# Patient Record
Sex: FEMALE | URBAN - METROPOLITAN AREA
[De-identification: names, ages, dates, MRNs, and addresses within clinical notes are randomized per-mention and may not be internally consistent; named-entity substitution may affect disease eponyms.]

---

## 2019-05-01 ENCOUNTER — THERAPY VISIT (OUTPATIENT)
Dept: PHYSICAL THERAPY | Facility: CLINIC | Age: 44
End: 2019-05-01
Payer: COMMERCIAL

## 2019-05-01 DIAGNOSIS — M54.50 ACUTE LEFT-SIDED LOW BACK PAIN WITHOUT SCIATICA: ICD-10-CM

## 2019-05-01 DIAGNOSIS — M54.6 ACUTE LEFT-SIDED THORACIC BACK PAIN: ICD-10-CM

## 2019-05-01 PROCEDURE — 97110 THERAPEUTIC EXERCISES: CPT | Mod: GP | Performed by: PHYSICAL THERAPIST

## 2019-05-01 PROCEDURE — 97161 PT EVAL LOW COMPLEX 20 MIN: CPT | Mod: GP | Performed by: PHYSICAL THERAPIST

## 2019-05-01 NOTE — LETTER
[facility]  [address]  [facility] [address]  [phone]    May 2, 2019    Re: Jm Baldwin   :   1975  MRN:  [id number]   REFERRING PHYSICIAN:   Larissa Delgadillo  [facility]  Date of Initial Evaluation: 2019  Visits:  Rxs Used: 1  Reason for Referral:     Acute left-sided low back pain without sciatica  Acute left-sided thoracic back pain  EVALUATION SUMMARY  [facility] [place] [facility] Initial Evaluation  Subjective:  The history is provided by the patient.   Jm Baldwin is a 43 year old female with a lumbar condition.  Condition occurred with:  Insidious onset.  Condition occurred: for unknown reasons.  This is a new and recurrent condition  Pt notes that she began having LBP about a month ago, pt not sure how the pain began. Her pain started in her L lower rib area. She stopped wearing her regular bra and switched to using a sports bra and the pain subsided, but didn't go away. She did consult w/her PCP for this 19 and was referred to PT..    Patient reports pain:  Lower thoracic spine and thoracic spine left (ribs area).  Radiates to:  No radiation.  Pain is described as aching (previously had stabbing, sharp pain) and is intermittent and reported as 4/10 (at worst 8/10).  Associated symptoms:  Loss of motion/stiffness. Pain is the same all the time.  Symptoms are exacerbated by sitting, twisting, lifting, standing, lying down and carrying (reaching) and relieved by rest and NSAID's (wearing sports bra/tank top, Tiger Balm).  Since onset symptoms are gradually improving.        General health as reported by patient is good.  Pertinent medical history includes:  Overweight.  Medical allergies: no.  Other surgeries include:  No.  Current medications:  Anti-inflammatory and pain medication.  Current occupation is 2 jobs, both are full time.  Patient is working in normal job without restrictions.  Primary  job tasks include:  Repetitive tasks, lifting, driving and other (computer work, push/pull).  Barriers include:  None as reported by the patient.  Red flags:  None as reported by the patient.  Objective:  System    Lumbar/SI Evaluation  ROM:    AROM Lumbar:   Flexion:          90% of NL w/pain  Ext:                    75%   Side Bend:        Left:  80% w/pain on L    Right:  80%, pulling on L and slight pain on R hip  Rotation:           Left:     Right:   Side Glide:        Left:     Right:         Lumbar Myotomes:  not assessed  Lumbar DTR's:  not assessed  Lumbar Dermtomes:  not assessed  Neural Tension/Mobility:  Lumbar:  Normal    Lumbar Palpation:  Palpation (lumbar): very tender at L lower to mid lateral ribcage.  Tenderness present at Left:    Quadratus Lumborum; Erector Spinae and Vertebral  Spinal Segmental Conclusions: Spring testing on lower Tspine was positive on the L, gapping at these levels alleviated her pain some.  P-A mob's in Lspine had mild restrictions at all levels, pain at all levels, but worst at L1/2 and into Tspine.    Senthil Lumbar Evaluation  Posture:  Sitting: fair  Standing: fair  Lordosis: Accentuated  Lateral Shift: no  Correction of Posture: no effect  Assessment/Plan:    Patient is a 43 year old female with thoracic and lumbar complaints.    Patient has the following significant findings with corresponding treatment plan.                Diagnosis 1:  L sided back pain  Pain -  hot/cold therapy, self management, education and home program  Decreased ROM/flexibility - manual therapy and therapeutic exercise  Decreased joint mobility - manual therapy and therapeutic exercise  Decreased strength - therapeutic exercise and therapeutic activities  Impaired muscle performance - neuro re-education  Decreased function - therapeutic activities  Impaired posture - neuro re-education and therapeutic activities    Therapy Evaluation Codes:   1) History comprised of:   Personal factors that  impact the plan of care:      Past/current experiences and Time since onset of symptoms.    Comorbidity factors that impact the plan of care are:      Overweight.     Medications impacting care: Anti-inflammatory and Pain.  2) Examination of Body Systems comprised of:   Body structures and functions that impact the plan of care:      Lumbar spine and Thoracic Spine.   Activity limitations that impact the plan of care are:      Bending, Lifting, Sitting, Standing and reaching.  3) Clinical presentation characteristics are:   Stable/Uncomplicated.  4) Decision-Making    Low complexity using standardized patient assessment instrument and/or measureable assessment of functional outcome.  Cumulative Therapy Evaluation is: Low complexity.    Previous and current functional limitations:  (See Goal Flow Sheet for this information)    Short term and Long term goals: (See Goal Flow Sheet for this information)     Communication ability:  Patient appears to be able to clearly communicate and understand verbal and written communication and follow directions correctly.  Treatment Explanation - The following has been discussed with the patient:   RX ordered/plan of care  Anticipated outcomes  Possible risks and side effects  This patient would benefit from PT intervention to resume normal activities.   Rehab potential is good.    Frequency:  1 X week, once daily  Duration:  for 6 weeks  Discharge Plan:  Achieve all LTG.  Independent in home treatment program.  Reach maximal therapeutic benefit.    Please refer to the daily flowsheet for treatment today, total treatment time and time spent performing 1:1 timed codes.     Thank you for your referral.    INQUIRIES  Therapist: Rut Archuleta [facility]  [facility]  [address]  [facility] [address]  Phone: [phone]  Fax: [phone]

## 2019-05-01 NOTE — PROGRESS NOTES
[facility] Initial Evaluation  Subjective:  The history is provided by the patient.   Jm Baldwin is a 43 year old female with a lumbar condition.  Condition occurred with:  Insidious onset.  Condition occurred: for unknown reasons.  This is a new and recurrent condition  Pt notes that she began having LBP about a month ago, pt not sure how the pain began. Her pain started in her L lower rib area. She stopped wearing her regular bra and switched to using a sports bra and the pain subsided, but didn't go away. She did consult w/her PCP for this 4/22/19 and was referred to PT..    Patient reports pain:  Lower thoracic spine and thoracic spine left (ribs area).  Radiates to:  No radiation.  Pain is described as aching (previously had stabbing, sharp pain) and is intermittent and reported as 4/10 (at worst 8/10).  Associated symptoms:  Loss of motion/stiffness. Pain is the same all the time.  Symptoms are exacerbated by sitting, twisting, lifting, standing, lying down and carrying (reaching) and relieved by rest and NSAID's (wearing sports bra/tank top, Tiger Balm).  Since onset symptoms are gradually improving.        General health as reported by patient is good.  Pertinent medical history includes:  Overweight.  Medical allergies: no.  Other surgeries include:  No.  Current medications:  Anti-inflammatory and pain medication.  Current occupation is 2 jobs, both are full time.  Patient is working in normal job without restrictions.  Primary job tasks include:  Repetitive tasks, lifting, driving and other (computer work, push/pull).    Barriers include:  None as reported by the patient.    Red flags:  None as reported by the patient.                        Objective:  System         Lumbar/SI Evaluation  ROM:    AROM Lumbar:   Flexion:          90% of NL w/pain  Ext:                    75%   Side Bend:        Left:  80% w/pain on L    Right:  80%, pulling on L and slight pain on R hip  Rotation:            Left:     Right:   Side Glide:        Left:     Right:           Lumbar Myotomes:  not assessed            Lumbar DTR's:  not assessed        Lumbar Dermtomes:  not assessed                Neural Tension/Mobility:  Lumbar:  Normal        Lumbar Palpation:  Palpation (lumbar): very tender at L lower to mid lateral ribcage.  Tenderness present at Left:    Quadratus Lumborum; Erector Spinae and Vertebral        Spinal Segmental Conclusions: Spring testing on lower Tspine was positive on the L, gapping at these levels alleviated her pain some.  P-A mob's in Lspine had mild restrictions at all levels, pain at all levels, but worst at L1/2 and into Tspine.                                                         Senthil Lumbar Evaluation    Posture:  Sitting: fair  Standing: fair  Lordosis: Accentuated  Lateral Shift: no  Correction of Posture: no effect                                                   ROS    Assessment/Plan:    Patient is a 43 year old female with thoracic and lumbar complaints.    Patient has the following significant findings with corresponding treatment plan.                Diagnosis 1:  L sided back pain  Pain -  hot/cold therapy, self management, education and home program  Decreased ROM/flexibility - manual therapy and therapeutic exercise  Decreased joint mobility - manual therapy and therapeutic exercise  Decreased strength - therapeutic exercise and therapeutic activities  Impaired muscle performance - neuro re-education  Decreased function - therapeutic activities  Impaired posture - neuro re-education and therapeutic activities    Therapy Evaluation Codes:   1) History comprised of:   Personal factors that impact the plan of care:      Past/current experiences and Time since onset of symptoms.    Comorbidity factors that impact the plan of care are:      Overweight.     Medications impacting care: Anti-inflammatory and Pain.  2) Examination of Body Systems comprised of:   Body  structures and functions that impact the plan of care:      Lumbar spine and Thoracic Spine.   Activity limitations that impact the plan of care are:      Bending, Lifting, Sitting, Standing and reaching.  3) Clinical presentation characteristics are:   Stable/Uncomplicated.  4) Decision-Making    Low complexity using standardized patient assessment instrument and/or measureable assessment of functional outcome.  Cumulative Therapy Evaluation is: Low complexity.    Previous and current functional limitations:  (See Goal Flow Sheet for this information)    Short term and Long term goals: (See Goal Flow Sheet for this information)     Communication ability:  Patient appears to be able to clearly communicate and understand verbal and written communication and follow directions correctly.  Treatment Explanation - The following has been discussed with the patient:   RX ordered/plan of care  Anticipated outcomes  Possible risks and side effects  This patient would benefit from PT intervention to resume normal activities.   Rehab potential is good.    Frequency:  1 X week, once daily  Duration:  for 6 weeks  Discharge Plan:  Achieve all LTG.  Independent in home treatment program.  Reach maximal therapeutic benefit.    Please refer to the daily flowsheet for treatment today, total treatment time and time spent performing 1:1 timed codes.

## 2019-05-08 ENCOUNTER — THERAPY VISIT (OUTPATIENT)
Dept: PHYSICAL THERAPY | Facility: CLINIC | Age: 44
End: 2019-05-08
Payer: COMMERCIAL

## 2019-05-08 DIAGNOSIS — M54.6 ACUTE LEFT-SIDED THORACIC BACK PAIN: ICD-10-CM

## 2019-05-08 DIAGNOSIS — M54.50 ACUTE LEFT-SIDED LOW BACK PAIN WITHOUT SCIATICA: ICD-10-CM

## 2019-05-08 PROCEDURE — 97110 THERAPEUTIC EXERCISES: CPT | Mod: GP | Performed by: PHYSICAL THERAPIST

## 2019-05-08 PROCEDURE — 97140 MANUAL THERAPY 1/> REGIONS: CPT | Mod: GP | Performed by: PHYSICAL THERAPIST

## 2019-05-30 ENCOUNTER — THERAPY VISIT (OUTPATIENT)
Dept: PHYSICAL THERAPY | Facility: CLINIC | Age: 44
End: 2019-05-30
Payer: COMMERCIAL

## 2019-05-30 DIAGNOSIS — M54.50 ACUTE LEFT-SIDED LOW BACK PAIN WITHOUT SCIATICA: ICD-10-CM

## 2019-05-30 DIAGNOSIS — M54.6 ACUTE LEFT-SIDED THORACIC BACK PAIN: ICD-10-CM

## 2019-05-30 PROCEDURE — 97530 THERAPEUTIC ACTIVITIES: CPT | Mod: GP | Performed by: PHYSICAL THERAPIST

## 2019-05-30 PROCEDURE — 97140 MANUAL THERAPY 1/> REGIONS: CPT | Mod: GP | Performed by: PHYSICAL THERAPIST

## 2019-05-30 PROCEDURE — 97112 NEUROMUSCULAR REEDUCATION: CPT | Mod: GP | Performed by: PHYSICAL THERAPIST

## 2019-05-30 PROCEDURE — 97110 THERAPEUTIC EXERCISES: CPT | Mod: GP | Performed by: PHYSICAL THERAPIST

## 2019-06-19 ENCOUNTER — THERAPY VISIT (OUTPATIENT)
Dept: PHYSICAL THERAPY | Facility: CLINIC | Age: 44
End: 2019-06-19
Payer: COMMERCIAL

## 2019-06-19 DIAGNOSIS — M54.6 ACUTE LEFT-SIDED THORACIC BACK PAIN: ICD-10-CM

## 2019-06-19 DIAGNOSIS — M54.50 ACUTE LEFT-SIDED LOW BACK PAIN WITHOUT SCIATICA: ICD-10-CM

## 2019-06-19 PROCEDURE — 97112 NEUROMUSCULAR REEDUCATION: CPT | Mod: GP | Performed by: PHYSICAL THERAPIST

## 2019-06-19 PROCEDURE — 97530 THERAPEUTIC ACTIVITIES: CPT | Mod: GP | Performed by: PHYSICAL THERAPIST

## 2019-06-19 PROCEDURE — 97110 THERAPEUTIC EXERCISES: CPT | Mod: GP | Performed by: PHYSICAL THERAPIST

## 2019-06-19 NOTE — LETTER
"[facility]  [address]  [facility] [address]  [phone]    2019    Re: Jm Baldwin   :   1975  MRN:  [id number]   REFERRING PHYSICIAN:   Larissa Delgadillo    [place] [facility]    Date of Initial Evaluation: 2019  Visits:  Rxs Used: 4  Reason for Referral:     Acute left-sided low back pain without sciatica  Acute left-sided thoracic back pain    EVALUATION SUMMARY  DISCHARGE REPORT  Progress reporting period is from 19 to 19 (4 sessions completed).       SUBJECTIVE  Subjective changes noted by patient:  Pt notes that her back pain is much better, but she still feels a lot of pressure when carrying groceries up the 3 flights to her apartment (no elevators). She was out of town and just got back yesterday, traveling for 12 hrs but stopping every 4 hr to get out and stretch.    Current pain level is  0/10.     Previous pain level was   Initial Pain level: 9/10(at worst).   Changes in function:  Yes (See Goal flowsheet attached for changes in current functional level)  Adverse reaction to treatment or activity: None    OBJECTIVE  Changes noted in objective findings:    LROM/TROM: flex and ext WNL, rot L WNL w/\"twinge\", rot R WNL. Pt able to lift 20# from 18 inches to counter, 3x and 10# in crate from waist level to shldr ht x 4, no prob's.     ASSESSMENT/PLAN  Updated problem list and treatment plan: Diagnosis 1:  Acute back pain    STG/LTGs have been met or progress has been made towards goals:  Yes (See Goal flow sheet completed today.)  Assessment of Progress: The patient has met all of their long term goals.  Self Management Plans:  Patient has been instructed in a home treatment program.  Patient is independent in a home treatment program.  Patient  has been instructed in self management of symptoms.  Patient is independent in self management of symptoms.  Jm continues to require the following " intervention to meet STG and LTG's:  PT intervention is no longer required to meet STG/LTG.    Recommendations:  This patient is ready to be discharged from therapy and continue their home treatment program.  Please refer to the daily flowsheet for treatment today, total treatment time and time spent performing 1:1 timed codes.  Thank you for your referral.    INQUIRIES  Therapist: DHAVAL AZAR [facility]  [address]  [facility] [address]  Phone: [phone]  Fax: [phone]

## 2019-06-19 NOTE — PROGRESS NOTES
"Subjective:  HPI  Oswestry Score: 12 %                 Objective:  System    Physical Exam    General     ROS    Assessment/Plan:    DISCHARGE REPORT    Progress reporting period is from 5/1/19 to 6/19/19 (4 sessions completed).       SUBJECTIVE  Subjective changes noted by patient:  Pt notes that her back pain is much better, but she still feels a lot of pressure when carrying groceries up the 3 flights to her apartment (no elevators). She was out of town and just got back yesterday, traveling for 12 hrs but stopping every 4 hr to get out and stretch.    Current pain level is  0/10.     Previous pain level was   Initial Pain level: 9/10(at worst).   Changes in function:  Yes (See Goal flowsheet attached for changes in current functional level)  Adverse reaction to treatment or activity: None    OBJECTIVE  Changes noted in objective findings:    LROM/TROM: flex and ext WNL, rot L WNL w/\"twinge\", rot R WNL. Pt able to lift 20# from 18 inches to counter, 3x and 10# in crate from waist level to shldr ht x 4, no prob's.     ASSESSMENT/PLAN  Updated problem list and treatment plan: Diagnosis 1:  Acute back pain    STG/LTGs have been met or progress has been made towards goals:  Yes (See Goal flow sheet completed today.)  Assessment of Progress: The patient has met all of their long term goals.  Self Management Plans:  Patient has been instructed in a home treatment program.  Patient is independent in a home treatment program.  Patient  has been instructed in self management of symptoms.  Patient is independent in self management of symptoms.    Jm continues to require the following intervention to meet STG and LTG's:  PT intervention is no longer required to meet STG/LTG.    Recommendations:  This patient is ready to be discharged from therapy and continue their home treatment program.    Please refer to the daily flowsheet for treatment today, total treatment time and time spent performing 1:1 timed codes.          "